# Patient Record
Sex: FEMALE | Race: WHITE
[De-identification: names, ages, dates, MRNs, and addresses within clinical notes are randomized per-mention and may not be internally consistent; named-entity substitution may affect disease eponyms.]

---

## 2019-03-10 ENCOUNTER — HOSPITAL ENCOUNTER (INPATIENT)
Dept: HOSPITAL 31 - C.ER | Age: 84
LOS: 3 days | Discharge: HOME | DRG: 191 | End: 2019-03-13
Attending: INTERNAL MEDICINE | Admitting: INTERNAL MEDICINE
Payer: MEDICARE

## 2019-03-10 VITALS — BODY MASS INDEX: 36.8 KG/M2

## 2019-03-10 DIAGNOSIS — M46.97: ICD-10-CM

## 2019-03-10 DIAGNOSIS — J20.9: ICD-10-CM

## 2019-03-10 DIAGNOSIS — N18.4: ICD-10-CM

## 2019-03-10 DIAGNOSIS — J44.0: Primary | ICD-10-CM

## 2019-03-10 DIAGNOSIS — E11.65: ICD-10-CM

## 2019-03-10 DIAGNOSIS — E11.22: ICD-10-CM

## 2019-03-10 DIAGNOSIS — Z99.81: ICD-10-CM

## 2019-03-10 DIAGNOSIS — I12.9: ICD-10-CM

## 2019-03-10 DIAGNOSIS — Z79.4: ICD-10-CM

## 2019-03-10 DIAGNOSIS — M79.89: ICD-10-CM

## 2019-03-10 LAB
ALBUMIN SERPL-MCNC: 3.6 G/DL (ref 3.5–5)
ALBUMIN/GLOB SERPL: 1.1 {RATIO} (ref 1–2.1)
ALT SERPL-CCNC: 14 U/L (ref 9–52)
APTT BLD: 31 SECONDS (ref 21–34)
AST SERPL-CCNC: 19 U/L (ref 14–36)
BASOPHILS # BLD AUTO: 0.1 K/UL (ref 0–0.2)
BASOPHILS NFR BLD: 1 % (ref 0–2)
BNP SERPL-MCNC: 851 PG/ML (ref 0–900)
BUN SERPL-MCNC: 34 MG/DL (ref 7–17)
CALCIUM SERPL-MCNC: 9.1 MG/DL (ref 8.6–10.4)
EOSINOPHIL # BLD AUTO: 0.4 K/UL (ref 0–0.7)
EOSINOPHIL NFR BLD: 4.8 % (ref 0–4)
ERYTHROCYTE [DISTWIDTH] IN BLOOD BY AUTOMATED COUNT: 14.7 % (ref 11.5–14.5)
GFR NON-AFRICAN AMERICAN: 29
HGB BLD-MCNC: 11.6 G/DL (ref 11–16)
INR PPP: 1.1
LYMPHOCYTES # BLD AUTO: 1.6 K/UL (ref 1–4.3)
LYMPHOCYTES NFR BLD AUTO: 17.3 % (ref 20–40)
MCH RBC QN AUTO: 29.1 PG (ref 27–31)
MCHC RBC AUTO-ENTMCNC: 32 G/DL (ref 33–37)
MCV RBC AUTO: 90.8 FL (ref 81–99)
MONOCYTES # BLD: 0.5 K/UL (ref 0–0.8)
MONOCYTES NFR BLD: 5.5 % (ref 0–10)
NEUTROPHILS # BLD: 6.4 K/UL (ref 1.8–7)
NEUTROPHILS NFR BLD AUTO: 71.4 % (ref 50–75)
NRBC BLD AUTO-RTO: 0 % (ref 0–2)
PLATELET # BLD: 344 K/UL (ref 130–400)
PMV BLD AUTO: 9.3 FL (ref 7.2–11.7)
PROTHROMBIN TIME: 12.5 SECONDS (ref 9.7–12.2)
RBC # BLD AUTO: 4 MIL/UL (ref 3.8–5.2)
WBC # BLD AUTO: 9 K/UL (ref 4.8–10.8)

## 2019-03-10 RX ADMIN — INSULIN ASPART SCH: 100 INJECTION, SOLUTION INTRAVENOUS; SUBCUTANEOUS at 21:54

## 2019-03-10 RX ADMIN — IPRATROPIUM BROMIDE AND ALBUTEROL SULFATE SCH ML: .5; 3 SOLUTION RESPIRATORY (INHALATION) at 15:54

## 2019-03-10 RX ADMIN — IPRATROPIUM BROMIDE AND ALBUTEROL SULFATE SCH ML: .5; 3 SOLUTION RESPIRATORY (INHALATION) at 16:10

## 2019-03-10 NOTE — RAD
Date of service: 



03/10/2019



PROCEDURE:  CHEST RADIOGRAPH, 1 VIEW



HISTORY:

chest pain



COMPARISON:

Comparison is made with 01/19/2019



FINDINGS:



LUNGS:

No evidence of new infiltrate or consolidation in the lungs.



PLEURA:

No pneumothorax or pleural fluid seen.



CARDIOVASCULAR:

 No aortic atherosclerotic calcification present. Normal.



OSSEOUS STRUCTURES:

No significant abnormalities.



VISUALIZED UPPER ABDOMEN:

Normal.



OTHER FINDINGS:

None. 



IMPRESSION:

No active disease.

## 2019-03-10 NOTE — C.PDOC
History Of Present Illness





 USED, [place] [id number]


82 y/o female, with history of mild diabetes, comes in complaining of SOB since 

last night, but no associated chest pain, contrary to triage note. Patient 

reports she gets SOB when she coughs, but otherwise she denies fever, chills, 

vomiting, or abdominal pain. As per , patient has O2 at home when she 

gets SOB. Patient was seen in ED 2 months ago for pneumonia and discharged home.

PMD: Roxie Duran 





Time Seen by Provider: 03/10/19 13:28


Chief Complaint (Nursing): Chest Pain


History Per: 


History/Exam Limitations: no limitations


Onset/Duration Of Symptoms: Days


Current Symptoms Are (Timing): Still Present





Past Medical History


Reviewed: Historical Data, Nursing Documentation, Vital Signs


Vital Signs: 





                                Last Vital Signs











Temp  97.4 F L  03/10/19 13:36


 


Pulse  68   03/10/19 13:49


 


Resp  16   03/10/19 13:36


 


BP  142/64   03/10/19 13:36


 


Pulse Ox  98   03/10/19 13:36














- Medical History


PMH: Arthritis (LBP), Asthma, Back Problems, COPD, Diabetes, Gall Bladder 

Disease, HTN


   Denies: Chronic Kidney Disease


Surgical History: Cholecystectomy





- [facility] Procedures











LAPAROSCOPIC CHOLECYSTECTOMY (13)


PERCUTAN ASPIRATION GB (13)


PLICATION OF VENA CAVA (14)








Family History: States: No Known Family Hx





- Social History


Hx Tobacco Use: No


Hx Alcohol Use: No


Hx Substance Use: No





- Immunization History


Hx Tetanus Toxoid Vaccination: No


Hx Influenza Vaccination: No


Hx Pneumococcal Vaccination: No





Review Of Systems


Except As Marked, All Systems Reviewed And Found Negative.


Constitutional: Negative for: Fever, Chills


Cardiovascular: Negative for: Chest Pain


Respiratory: Positive for: Shortness of Breath.  Negative for: Cough


Gastrointestinal: Negative for: Vomiting, Abdominal Pain


Genitourinary: Negative for: Dysuria, Hematuria


Neurological: Negative for: Weakness, Numbness





Physical Exam





- Physical Exam


Appears: Well, Non-toxic, No Acute Distress


Skin: Warm, Dry


Head: Atraumatic, Normacephalic


Eye(s): bilateral: PERRL, EOMI, Other (conjunctiva clear)


Oral Mucosa: Moist


Neck: Supple


Chest: Symmetrical


Cardiovascular: Rhythm Regular, No Murmur, Other (Normal S1,S2)


Respiratory: No Rales, No Rhonchi, Wheezing (mild expiratory wheezing)


Gastrointestinal/Abdominal: Soft, No Tenderness, No Distention, No Guarding, No 

Rebound


Extremity: No Pedal Edema, No Deformity


Extremity: Bilateral: Atraumatic, Normal Color And Temperature


Neurological/Psych: Oriented x3, Normal Speech, Other (GCS 15, CN 2-12 intact)





ED Course And Treatment





- Laboratory Results


Result Diagrams: 


                                 03/10/19 14:04





                                 03/10/19 14:04


Lab Results: 





                                        











PT  12.5 SECONDS (9.7-12.2)  H  03/10/19  14:04    


 


INR  1.1   03/10/19  14:04    


 


APTT  31 SECONDS (21-34)   03/10/19  14:04    











ECG: Interpreted By Me, Viewed By Me


ECG Rhythm: Sinus Rhythm


ECG Interpretation: Normal


Rate From EC


O2 Sat by Pulse Oximetry: 98 (RA)


Pulse Ox Interpretation: Normal





- Other Rad


  ** CXR


X-Ray: Read By Radiologist


Interpretation: IMPRESSION:  No active disease.





Medical Decision Making


Medical Decision Making: 





Plan:


--EKG


--Labs


--Chest XR





15:50


Spoke to patient's daughter on the phone who states that patient had chest pain 

all night yesterday. 





18:08:


Patient has brown stool. 


Control positive 


Hemoccult negative. 


[id number]


exp: 





Progress:


Dr. Wright accepted patient under his service for admission. 





Disposition





- Disposition


Forms:  [facility] (English)





- Scribe Statement


The provider has reviewed the documentation as recorded by the Eusebioibdora Mcgee





Provider Attestation: 





All medical record entries made by the Scribe were at my direction and 

personally dictated by me. I have reviewed the chart and agree that the record 

accurately reflects my personal performance of the history, physical exam, 

medical decision making, and the department course for this patient. I have also

personally directed, reviewed, and agree with the discharge instructions and 

disposition.

## 2019-03-11 LAB
ALBUMIN SERPL-MCNC: 3.7 G/DL (ref 3.5–5)
ALBUMIN/GLOB SERPL: 1 {RATIO} (ref 1–2.1)
ALT SERPL-CCNC: 17 U/L (ref 9–52)
AST SERPL-CCNC: 16 U/L (ref 14–36)
BACTERIA #/AREA URNS HPF: (no result) /[HPF]
BASOPHILS # BLD AUTO: 0 K/UL (ref 0–0.2)
BASOPHILS NFR BLD: 0.2 % (ref 0–2)
BILIRUB UR-MCNC: NEGATIVE MG/DL
BUN SERPL-MCNC: 37 MG/DL (ref 7–17)
CALCIUM SERPL-MCNC: 9.1 MG/DL (ref 8.6–10.4)
EOSINOPHIL # BLD AUTO: 0 K/UL (ref 0–0.7)
EOSINOPHIL NFR BLD: 0 % (ref 0–4)
ERYTHROCYTE [DISTWIDTH] IN BLOOD BY AUTOMATED COUNT: 14.5 % (ref 11.5–14.5)
ERYTHROCYTE [DISTWIDTH] IN BLOOD BY AUTOMATED COUNT: 14.8 % (ref 11.5–14.5)
GFR NON-AFRICAN AMERICAN: 29
GLUCOSE UR STRIP-MCNC: NORMAL MG/DL
HGB BLD-MCNC: 11.1 G/DL (ref 11–16)
HGB BLD-MCNC: 11.9 G/DL (ref 11–16)
LEUKOCYTE ESTERASE UR-ACNC: (no result) LEU/UL
LYMPHOCYTE: 6 % (ref 20–40)
LYMPHOCYTES # BLD AUTO: 1.4 K/UL (ref 1–4.3)
LYMPHOCYTES NFR BLD AUTO: 7.9 % (ref 20–40)
MCH RBC QN AUTO: 28.4 PG (ref 27–31)
MCH RBC QN AUTO: 28.5 PG (ref 27–31)
MCHC RBC AUTO-ENTMCNC: 31.5 G/DL (ref 33–37)
MCHC RBC AUTO-ENTMCNC: 31.7 G/DL (ref 33–37)
MCV RBC AUTO: 89.9 FL (ref 81–99)
MCV RBC AUTO: 90.2 FL (ref 81–99)
MONOCYTE: 4 % (ref 0–10)
MONOCYTES # BLD: 0.7 K/UL (ref 0–0.8)
MONOCYTES NFR BLD: 3.7 % (ref 0–10)
NEUTROPHILS # BLD: 15.9 K/UL (ref 1.8–7)
NEUTROPHILS NFR BLD AUTO: 88 % (ref 50–75)
NEUTROPHILS NFR BLD AUTO: 88.2 % (ref 50–75)
NEUTS BAND NFR BLD: 2 % (ref 0–2)
NRBC BLD AUTO-RTO: 0 % (ref 0–2)
PH UR STRIP: 5 [PH] (ref 5–8)
PLATELET # BLD EST: NORMAL 10*3/UL
PLATELET # BLD: 368 K/UL (ref 130–400)
PLATELET # BLD: 392 K/UL (ref 130–400)
PMV BLD AUTO: 8.9 FL (ref 7.2–11.7)
PMV BLD AUTO: 9.1 FL (ref 7.2–11.7)
PROT UR STRIP-MCNC: NEGATIVE MG/DL
RBC # BLD AUTO: 3.89 MIL/UL (ref 3.8–5.2)
RBC # BLD AUTO: 4.2 MIL/UL (ref 3.8–5.2)
RBC # UR STRIP: NEGATIVE /UL
RBC MORPH BLD: NORMAL
SP GR UR STRIP: 1.01 (ref 1–1.03)
SQUAMOUS EPITHIAL: 6 /HPF (ref 0–5)
TOTAL CELLS COUNTED BLD: 100
URINE AMORPHOUS SEDIMENT: (no result) /UL
UROBILINOGEN UR-MCNC: NORMAL MG/DL (ref 0.2–1)
WBC # BLD AUTO: 18 K/UL (ref 4.8–10.8)
WBC # BLD AUTO: 18.7 K/UL (ref 4.8–10.8)

## 2019-03-11 RX ADMIN — INSULIN ASPART SCH: 100 INJECTION, SOLUTION INTRAVENOUS; SUBCUTANEOUS at 12:04

## 2019-03-11 RX ADMIN — CEFEPIME SCH MLS/HR: 1 INJECTION, SOLUTION INTRAVENOUS at 13:33

## 2019-03-11 RX ADMIN — STANDARDIZED SENNA CONCENTRATE AND DOCUSATE SODIUM SCH TAB: 8.6; 5 TABLET, FILM COATED ORAL at 15:03

## 2019-03-11 RX ADMIN — INSULIN ASPART SCH: 100 INJECTION, SOLUTION INTRAVENOUS; SUBCUTANEOUS at 17:25

## 2019-03-11 RX ADMIN — FLUTICASONE FUROATE AND VILANTEROL TRIFENATATE SCH: 100; 25 POWDER RESPIRATORY (INHALATION) at 11:25

## 2019-03-11 RX ADMIN — Medication SCH TAB: at 10:30

## 2019-03-11 RX ADMIN — GUAIFENESIN SCH MG: 600 TABLET, EXTENDED RELEASE ORAL at 17:23

## 2019-03-11 RX ADMIN — INSULIN ASPART SCH: 100 INJECTION, SOLUTION INTRAVENOUS; SUBCUTANEOUS at 22:21

## 2019-03-11 RX ADMIN — INSULIN ASPART SCH: 100 INJECTION, SOLUTION INTRAVENOUS; SUBCUTANEOUS at 08:40

## 2019-03-11 RX ADMIN — GUAIFENESIN SCH MG: 600 TABLET, EXTENDED RELEASE ORAL at 10:31

## 2019-03-11 RX ADMIN — CEFEPIME SCH MLS/HR: 1 INJECTION, SOLUTION INTRAVENOUS at 23:53

## 2019-03-11 NOTE — CP.PCM.CON
History of Present Illness





- History of Present Illness


History of Present Illness: 





82 y/o female, with history of mild diabetes, comes in complaining of SOB since 

last night, but no associated chest pain, contrary to triage note. Patient 

reports she gets SOB when she coughs, but otherwise she denies fever, chills, vo

miting, or abdominal pain. As per , patient has O2 at home when she gets 

SOB. Patient was seen in ED 2 months ago for pneumonia and discharged home. 








- Medical History


PMH: Arthritis (LBP), Asthma, Back Problems, COPD, Diabetes, Gall Bladder 

Disease, HTN


   Denies: Chronic Kidney Disease


Surgical History: Cholecystectomy





- [facility] Procedures











LAPAROSCOPIC CHOLECYSTECTOMY (09/19/13)


PERCUTAN ASPIRATION GB (09/19/13)


PLICATION OF VENA CAVA (03/21/14)





Review of Systems





- Constitutional


Constitutional: As Per HPI





- EENT


Eyes: absent: As Per HPI, Blind Spots, Blurred Vision, Change in Vision, 

Decreased Night Vision, Diplopia, Discharge, Dry Eye, Exophthalmos, Floaters, 

Irritation, Itchy Eyes, Loss of Peripheral Vision, Pain, Photophobia, Requires 

Corrective Lenses, Sees Flashes, Spots in Vision, Tunnel Vision, Other Visual 

Disturbances, Loss of Vision, Other


Ears: absent: As Per HPI, Decreased Hearing, Ear Discharge, Ear Pain, Tinnitus, 

Abnormal Hearing, Disequilibrium, Dizziness, Other


Nose/Mouth/Throat: absent: As Per HPI, Epistaxis, Nasal Congestion, Nasal 

Discharge, Nasal Obstruction, Nasal Trauma, Nose Pain, Post Nasal Drip, Sinus 

Pain, Sinus Pressure, Bleeding Gums, Change in Voice, Dental Pain, Dry Mouth, 

Dysphagia, Halitosis, Hoarsness, Lip Swelling, Mouth Lesions, Mouth Pain, 

Odynophagia, Sore Throat, Throat Swelling, Tongue Swelling, Facial Pain, Neck 

Pain, Neck Mass, Other





- Breasts


Breasts: absent: As Per HPI, Change in Shape, Mass, Pain, Nipple Discharge, 

Nipple Inversion, Skin Changes, Swelling, Other





- Cardiovascular


Cardiovascular: As Per HPI





- Respiratory


Respiratory: absent: As Per HPI, Cough, Dyspnea, Hemoptysis, Dyspnea on 

Exertion, Wheezing, Snoring, Stridor, Pain on Inspiration, Chest Congestion, 

Excessive Mucous Production, Change in Mucous Color, Pain with Coughing, Other





- Gastrointestinal


Gastrointestinal: As Per HPI





- Genitourinary


Genitourinary: absent: As Per HPI, Change in Urinary Stream, Difficulty 

Urinating, Dysuria, Flank Pain, Hematuria, Pyuria, Nocturia, Urinary 

Incontinence, Urinary Frequency, Urinary Hesitance, Urinary Urgency, Voiding 

Freq/Small Amts, Freq UTI, Hx Renal/Bladder Calculi, Hx /Renal Surgery, 

Bladder Distension, Other





- Reproductive: Female


Reproductive:Female: absent: As Per HPI, Amenorrhea, Amenorrhea/Birth Control, 

Currently Menstual, Cycle <21 Days, Cycle >35 Days, Cycle Variable, Menses 1-7 

Days, Menses >/= 8 Days, Menses Variable, Cycle > 4 Weeks Between, No Menses for

6 Months, Heavy Menses, Light Menses, Normal Menses, Spotting Between Cycles, 

S/P Hysterectomy, Menopausal, Post Menopausal, Premenarche, Abnormal Vaginal 

Bleeding, Dysmenorrhea, Dyspareunia, Genital Lesions, Genital Pruritis, Pelvic 

Pain, Prolapse Symptoms, Sexual Dysfunction, Vaginal Discharge, Vaginal Dryness,

Vaginal Odor, Vaginal Pruritis, Other





- Menstruation


Menstruation: absent: As Per HPI, Amenorrhea, Amenorrhea/Birth Control, 

Currently Menstual, Cycle <21 Days, Cycle >35 Days, Cycle Variable, Menses 1-7 

Days, Menses >/= 8 Days, Menses Variable, Cycle > 4 Weeks Between, No Menses for

6 Months, Heavy Menses, Light Menses, Normal Menses, Spotting Between Cycles, 

S/P Hysterectomy, Menopausal, Post Menopausal, Premenarche, Abnormal Vaginal 

Bleeding, Dysmenorrhea, Other





- Musculoskeletal


Musculoskeletal: absent: As Per HPI, Abnormal Gait, Arthralgias, Atrophy, Back 

Pain, Deformity, Joint Swelling, Limited Range of Motion, Loss of Height, Muscle

Cramps, Muscle Weakness, Myalgias, Neck Pain, Numbness, Radiating Pain into 

Limb, Stiffness, Tingling, Other





- Integumentary


Integumentary: absent: As Per HPI, Acne, Alopecia, Bleeding Lesions, Change in 

Hair, Change in Nails, Change in Pigmentation, Changing Lesions, Dry Skin, 

Erythema, Furuncle, Hirsutism, Lesions, New Lesions, Non-Healing Lesions, 

Photosensitivity, Pruritus, Rash, Skin Pain, Skin Ulcer, Sores, Striae, 

Swelling, Unusual Bruising, Wounds, Jaundice, Other





- Neurological


Neurological: absent: As Per HPI, Abnormal Gait, Abnormal Hearing, Abnormal 

Movements, Abnormal Speech, Behavioral Changes, Burning Sensations, Confusion, 

Convulsions, Disequilibrium, Dizziness, Numbness, Focal Weakness, Frequent Fall

s, Headaches, Lack of Coordination, Loss of Vision, Memory Loss, Paresthesias, 

Radicular Pain, Restless Legs, Sensory Deficit, Syncope, Tingling, Tremor, 

Vertigo, Weakness, Other Visual Disturbances, Other





- Psychiatric


Psychiatric: absent: As Per HPI, Abnormal Sleep Pattern, Anhedonia, Anxiety, 

Auditory Hallucinations, Behavioral Changes, Change in Appetite, Change in 

Libido, Confusion, Depression, Difficulty Concentrating, Hallucinations, 

Homicidal Ideation, Hopelessness, Irritability, Memory Loss, Mood Swings, Panic 

Attacks, Paranoia, Suicidal Ideation, Visual Hallucinations, Tactile 

Hallucinations, Other





- Endocrine


Endocrine: absent: As Per HPI, Change in Body Appearance, Change in Libido, Cold

Intolorance, Deepening of Voice, Excessive Sweating, Fatigue, Flushing, Heat 

Intolorance, Increase in Ring/Shoe/Hat Size, Palpitations, Polydipsia, 

Polyphagia, Polyuria, Other





- Hematologic/Lymphatic


Hematologic: absent: As Per HPI, Easy Bleeding, Easy Bruising, Lymphadenopathy, 

Other





Past Patient History





- Past Medical History & Family History


Past Medical History?: Yes





- Past Social History


Smoking Status: Never Smoked





- CARDIAC


Hx Cardiac Disorders: Yes


Hx Hypertension: Yes





- PULMONARY


Hx Respiratory Disorders: Yes


Hx Asthma: Yes


Hx Chronic Obstructive Pulmonary Disease (COPD): Yes





- NEUROLOGICAL


Hx Neurological Disorder: No





- HEENT


Hx HEENT Problems: No


Other/Comment: eye dryness,uses liquifilm at home





- RENAL


Hx Chronic Kidney Disease: No





- ENDOCRINE/METABOLIC


Hx Endocrine Disorders: Yes


Hx Diabetes Mellitus Type 1: Yes





- HEMATOLOGICAL/ONCOLOGICAL


Hx Blood Disorders: No





- INTEGUMENTARY


Hx Dermatological Problems: No


Other/Comment: whitish skin discoloration chest and part of abdomen appeared 

since long time ago





- MUSCULOSKELETAL/RHEUMATOLOGICAL


Hx Musculoskeletal Disorders: No


Hx Arthritis: Yes (LBP)


Hx Falls: No





- GASTROINTESTINAL


Hx Gastrointestinal Disorders: Yes


Hx Constipation: Yes (states she takes med to help with bowel movement (med from

[facility]))


Hx Gall Bladder Disease: Yes (cholecystectomy)





- GENITOURINARY/GYNECOLOGICAL


Hx Genitourinary Disorders: No





- PSYCHIATRIC


Hx Psychophysiologic Disorder: No


Hx Substance Use: No





- SURGICAL HISTORY


Hx Surgeries: Yes


Hx Cholecystectomy: Yes





- ANESTHESIA


Hx Anesthesia: Yes


Hx Anesthesia Reactions: No


Hx Malignant Hyperthermia: No





Meds


Allergies/Adverse Reactions: 


                                    Allergies











Allergy/AdvReac Type Severity Reaction Status Date / Time


 


No Known Allergies Allergy   Verified 03/10/19 13:38














- Medications


Medications: 


                               Current Medications





Enoxaparin Sodium (Lovenox)  40 mg SC DAILY [facility]


   Last Admin: 03/11/19 10:34 Dose:  40 mg


Fluticasone/Vilanterol (Breo Ellipta 100-25 Mcg Inh)  1 puff INH RQ24 [facility]


Glipizide (Glucotrol)  5 mg PO DAILY [facility]


   Last Admin: 03/11/19 10:30 Dose:  5 mg


Guaifenesin (Mucinex La)  600 mg PO BID [facility]


   Last Admin: 03/11/19 10:31 Dose:  600 mg


Home Med (Fosamax)  70 mg PO QWK [facility]


Sodium Chloride (Sodium Chloride 0.45%)  1,000 mls @ 60 mls/hr IV .[id number] [facility]


Cefepime HCl (Maxipime Iv 1 Gm Premix)  1 gm in 50 mls @ 100 mls/hr IVPB Q12H 

[facility]; Protocol


Insulin Aspart (Novolog)  0 unit SC ACHS [facility]; Protocol


   Last Admin: 03/11/19 08:40 Dose:  Not Given


Lactulose (Enulose)  20 gm PO DAILY [facility]


   Last Admin: 03/11/19 10:28 Dose:  20 gm


Loratadine (Claritin)  10 mg PO DAILY [facility]


   Last Admin: 03/11/19 10:31 Dose:  10 mg


Losartan Potassium (Cozaar)  100 mg PO DAILY [facility]


   Last Admin: 03/11/19 10:30 Dose:  100 mg


Multivitamins (Hexavitamin)  1 tab PO DAILY [facility]


   Last Admin: 03/11/19 10:30 Dose:  1 tab


Rosuvastatin Calcium (Crestor)  40 mg PO HS [facility]


   Last Admin: 03/10/19 21:55 Dose:  40 mg


Tolterodine Tartrate (Detrol)  4 mg PO DAILY [facility]


   Last Admin: 03/11/19 10:31 Dose:  4 mg











Physical Exam





- Constitutional


Appears: No Acute Distress, Chronically Ill





- Head Exam


Head Exam: ATRAUMATIC, NORMAL INSPECTION, NORMOCEPHALIC





- Eye Exam


Eye Exam: PERRL.  absent: Scleral icterus


Pupil Exam: NORMAL ACCOMODATION





- ENT Exam


ENT Exam: Mucous Membranes Dry, Normal External Ear Exam, Normal Oropharynx





- Neck Exam


Neck exam: Negative for: Lymphadenopathy





- Respiratory Exam


Respiratory Exam: Decreased Breath Sounds, Prolonged Expiratory Phase





- Cardiovascular Exam


Cardiovascular Exam: REGULAR RHYTHM, +S1, +S2





- GI/Abdominal Exam


GI & Abdominal Exam: Diminished Bowel Sounds, Soft.  absent: Tenderness





- Rectal Exam


Rectal Exam: Deferred





-  Exam


 Exam: NORMAL INSPECTION





- Extremities Exam


Extremities exam: Positive for: pedal pulses present.  Negative for: calf 

tenderness, normal inspection, pedal edema, tenderness





- Back Exam


Back exam: absent: CVA tenderness (L), CVA tenderness (R)





- Neurological Exam


Neurological exam: Alert, CN II-XII Intact, Oriented x3, Reflexes Normal





- Psychiatric Exam


Psychiatric exam: Depressed





- Skin


Skin Exam: Dry, Intact





Results





- Vital Signs


Recent Vital Signs: 


                                Last Vital Signs











Temp  97.9 F   03/11/19 08:24


 


Pulse  80   03/11/19 10:30


 


Resp  20   03/11/19 08:24


 


BP  118/69   03/11/19 10:30


 


Pulse Ox  95   03/11/19 10:30














- Labs


Result Diagrams: 


                                 03/11/19 20:21





                                 03/11/19 10:25


Labs: 


                         Laboratory Results - last 24 hr











  03/10/19 03/10/19 03/10/19





  14:04 14:04 14:04


 


WBC  9.0  


 


RBC  4.00  


 


Hgb  11.6  


 


Hct  36.4  


 


MCV  90.8  D  


 


MCH  29.1  


 


MCHC  32.0 L  


 


RDW  14.7 H  


 


Plt Count  344  


 


MPV  9.3  


 


Neut % (Auto)  71.4  


 


Lymph % (Auto)  17.3 L  


 


Mono % (Auto)  5.5  


 


Eos % (Auto)  4.8 H  


 


Baso % (Auto)  1.0  


 


Neut # (Auto)  6.4  


 


Lymph # (Auto)  1.6  


 


Mono # (Auto)  0.5  


 


Eos # (Auto)  0.4  


 


Baso # (Auto)  0.1  


 


Neutrophils % (Manual)   


 


Band Neutrophils %   


 


Lymphocytes % (Manual)   


 


Monocytes % (Manual)   


 


Platelet Estimate   


 


RBC Morphology   


 


PT   12.5 H 


 


INR   1.1 


 


APTT   31 


 


D-Dimer, Quantitative   


 


Sodium    139


 


Potassium    4.9


 


Chloride    108 H


 


Carbon Dioxide    25


 


Anion Gap    11


 


BUN    34 H


 


Creatinine    1.7 H


 


Est GFR ( Amer)    35


 


Est GFR (Non-Af Amer)    29


 


POC Glucose (mg/dL)   


 


Random Glucose    105


 


Calcium    9.1


 


Phosphorus   


 


Magnesium   


 


Total Bilirubin    0.6


 


AST    19


 


ALT    14


 


Alkaline Phosphatase    86


 


Troponin I    < 0.0120


 


NT-Pro-B Natriuret Pep    851


 


Total Protein    7.0


 


Albumin    3.6


 


Globulin    3.4


 


Albumin/Globulin Ratio    1.1














  03/10/19 03/10/19 03/11/19





  14:29 21:14 06:38


 


WBC   


 


RBC   


 


Hgb   


 


Hct   


 


MCV   


 


MCH   


 


MCHC   


 


RDW   


 


Plt Count   


 


MPV   


 


Neut % (Auto)   


 


Lymph % (Auto)   


 


Mono % (Auto)   


 


Eos % (Auto)   


 


Baso % (Auto)   


 


Neut # (Auto)   


 


Lymph # (Auto)   


 


Mono # (Auto)   


 


Eos # (Auto)   


 


Baso # (Auto)   


 


Neutrophils % (Manual)   


 


Band Neutrophils %   


 


Lymphocytes % (Manual)   


 


Monocytes % (Manual)   


 


Platelet Estimate   


 


RBC Morphology   


 


PT   


 


INR   


 


APTT   


 


D-Dimer, Quantitative  676 H  


 


Sodium   


 


Potassium   


 


Chloride   


 


Carbon Dioxide   


 


Anion Gap   


 


BUN   


 


Creatinine   


 


Est GFR ( Amer)   


 


Est GFR (Non-Af Amer)   


 


POC Glucose (mg/dL)   207 H  155 H


 


Random Glucose   


 


Calcium   


 


Phosphorus   


 


Magnesium   


 


Total Bilirubin   


 


AST   


 


ALT   


 


Alkaline Phosphatase   


 


Troponin I   


 


NT-Pro-B Natriuret Pep   


 


Total Protein   


 


Albumin   


 


Globulin   


 


Albumin/Globulin Ratio   














  03/11/19 03/11/19





  10:25 10:25


 


WBC  18.0 H D 


 


RBC  4.20 


 


Hgb  11.9 


 


Hct  37.9 


 


MCV  90.2 


 


MCH  28.4 


 


MCHC  31.5 L 


 


RDW  14.8 H 


 


Plt Count  392 


 


MPV  8.9 


 


Neut % (Auto)  88.2 H 


 


Lymph % (Auto)  7.9 L 


 


Mono % (Auto)  3.7 


 


Eos % (Auto)  0.0 


 


Baso % (Auto)  0.2 


 


Neut # (Auto)  15.9 H 


 


Lymph # (Auto)  1.4 


 


Mono # (Auto)  0.7 


 


Eos # (Auto)  0.0 


 


Baso # (Auto)  0.0 


 


Neutrophils % (Manual)  88 H 


 


Band Neutrophils %  2 


 


Lymphocytes % (Manual)  6 L 


 


Monocytes % (Manual)  4 


 


Platelet Estimate  Normal 


 


RBC Morphology  Normal 


 


PT  


 


INR  


 


APTT  


 


D-Dimer, Quantitative  


 


Sodium   138


 


Potassium   5.0


 


Chloride   109 H


 


Carbon Dioxide   24


 


Anion Gap   10


 


BUN   37 H


 


Creatinine   1.7 H


 


Est GFR ( Amer)   35


 


Est GFR (Non-Af Amer)   29


 


POC Glucose (mg/dL)  


 


Random Glucose   150 H D


 


Calcium   9.1


 


Phosphorus   3.9


 


Magnesium   2.0


 


Total Bilirubin   0.5


 


AST   16


 


ALT   17


 


Alkaline Phosphatase   77


 


Troponin I  


 


NT-Pro-B Natriuret Pep  


 


Total Protein   7.3


 


Albumin   3.7


 


Globulin   3.6


 


Albumin/Globulin Ratio   1.0














Assessment & Plan


(1) Dyspnea


Status: Acute   





(2) Leg swelling


Status: Acute   





(3) Leukocytosis


Status: Acute   





- Assessment and Plan (Free Text)


Assessment: 





r/o sepsis


source unclear


r/o early pneumonia


r/o UTI





Iv antibiotics ordered after cultures sent

## 2019-03-11 NOTE — CARD
--------------- APPROVED REPORT --------------





Date of service: 03/10/2019



EKG Measurement

Heart [id number]

[place] [id number]

[id number]

[id number]

[id number]



<Conclusion>

Normal sinus rhythm

Normal ECG

## 2019-03-11 NOTE — NM
Date of service: 



03/10/2019



COMPARISON:

03/21/2014 ventilation-perfusion scan.



03/10/2019 single-view chest



TECHNIQUE:

14.2 mCi technetium 99-m  [id number] Gas. 



4.0 mCI technetium 99-m MAA administered intravenously.



FINDINGS:



VENTILATION COMPONENT:

Normal.



PERFUSION COMPONENT:

Heterogeneous distribution of radionuclide. No geographic, segmental, 

lobar abnormalities apparent on the present examination.



IMPRESSION:

Low probability ventilation perfusion scan for pulmonary embolism. 



No significant interval change compared to the prior examination(s).



___________________________________________________



Concordant findings (preliminary report) provided by [facility].

## 2019-03-11 NOTE — CARD
--------------- APPROVED REPORT --------------





Date of service: 03/11/2019



EXAM: Two-dimensional and M-mode echocardiogram with Doppler and 

color Doppler.



Other Information 

Quality : GoodRhythm : 



INDICATION

Dyspnea Chest Pain 



RISK FACTORS

Hypertension 

Diabetes



2D DIMENSIONS 

IVSd1.0   (0.7-1.1cm)LVDd4.7   (3.9-5.9cm)

PWd0.8   (0.7-1.1cm)LA Lybahr86   (18-58mL)

LVDs3.5   (2.5-4.0cm)FS (%) 25.1   %

LVEF (%)70.0   (>50%)LVEF (Faustin's)66.16 %

IVC0.00 cm



M-Mode DIMENSIONS 

RVDd1.25   (2.1-3.2cm)Left Atrium (MM)4.96   (2.5-4.0cm)

IVSd1.08   (0.7-1.1cm)Aortic Root2.81   (2.2-3.7cm)

LVDd5.34   (4.0-5.6cm)Aortic Cusp Exc.1.85   (1.5-2.0cm)

PWd1.18   (0.7-1.1cm)FS (%) 51   %

LVDs2.60   (2.0-3.8cm)LVEF (%)82   (>50%)



Mitral Valve

MV E Utshbqhm73.3cm/sMV A Ufgnyahs774.0cm/sE/A ratio0.7



TDI

Lateral E' Peak V9.51cm/sMedial E' Peak V7.00cm/sE/Lateral E'7.7

E/Medial E'10.5



Tricuspid Valve

TR Peak [id number]/sTR Peak [id number]



 LEFT VENTRICLE 

The left ventricle is normal size.

There is normal left ventricular wall thickness.

The left ventricular function is normal.

The left ventricular ejection fraction is within the normal range.

There is normal LV segmental wall motion.

Transmitral Doppler flow pattern is normal for age.



 RIGHT VENTRICLE 

The right ventricle is normal size.

The right ventricular systolic function is normal.



 ATRIA 

The left atrium is mildly dilated.

The right atrium size is normal.



 AORTIC VALVE 

The aortic valve is normal in structure.

No aortic regurgitation is present.

There is no aortic valvular stenosis. 



 MITRAL VALVE 

The mitral valve is normal in structure.

Mitral annular calcification is borderline.

There is no mitral valve regurgitation noted.



 TRICUSPID VALVE 

The tricuspid valve is normal in structure.

There is mild tricuspid regurgitation.

There is no pulmonary hypertension.



 PULMONIC VALVE 

The pulmonary valve is normal in structure.

There is trace pulmonic valvular regurgitation. 



 GREAT VESSELS 

The aortic root is normal in 

The aortic root displays mild sclerocalcific changes

The IVC is normal in size and collapses >50% with inspiration.



 PERICARDIAL EFFUSION 

There is no pericardial effusion.



<Conclusion>

Normal bi-ventricular function.

The left atrium is mildly dilated.

No significant valvular abnormality for age.

No pericardial effusion.

## 2019-03-11 NOTE — CP.PCM.HP
History of Present Illness





- History of Present Illness


History of Present Illness: 





pt came for sob pain and swelling both legs 





Present on Admission





- Present on Admission


Any Indicators Present on Admission: No





Review of Systems





- Review of Systems


Systems not reviewed;Unavailable: Acuity of Condition





- Constitutional


Constitutional: Fatigue, Malaise





- EENT


Eyes: As Per HPI


Ears: As Per HPI


Nose/Mouth/Throat: As Per HPI





- Breasts


Breasts: As Per HPI





- Cardiovascular


Cardiovascular: Edema, Lightheadedness





- Respiratory


Respiratory: Dyspnea





- Gastrointestinal


Gastrointestinal: As Per HPI





- Genitourinary


Genitourinary: As Per HPI





- Reproductive: Female


Reproductive:Female: As Per HPI, Post Menopausal





- Menstruation


Menstruation: Post Menopausal





- Musculoskeletal


Musculoskeletal: Arthralgias, Joint Swelling, Radiating Pain into Limb





- Integumentary


Integumentary: As Per HPI





- Neurological


Neurological: Lack of Coordination, Weakness





- Psychiatric


Psychiatric: As Per HPI





- Endocrine


Endocrine: As Per HPI





- Hematologic/Lymphatic


Hematologic: As Per HPI





Past Patient History





- Past Medical History & Family History


Past Medical History?: Yes





- Past Social History


Smoking Status: Never Smoked





- CARDIAC


Hx Cardiac Disorders: Yes


Hx Hypertension: Yes





- PULMONARY


Hx Respiratory Disorders: Yes


Hx Asthma: Yes


Hx Chronic Obstructive Pulmonary Disease (COPD): Yes





- NEUROLOGICAL


Hx Neurological Disorder: No





- HEENT


Hx HEENT Problems: No


Other/Comment: eye dryness,uses liquifilm at home





- RENAL


Hx Chronic Kidney Disease: No





- ENDOCRINE/METABOLIC


Hx Endocrine Disorders: Yes


Hx Diabetes Mellitus Type 1: Yes





- HEMATOLOGICAL/ONCOLOGICAL


Hx Blood Disorders: No





- INTEGUMENTARY


Hx Dermatological Problems: No


Other/Comment: whitish skin discoloration chest and part of abdomen appeared 

since long time ago





- MUSCULOSKELETAL/RHEUMATOLOGICAL


Hx Musculoskeletal Disorders: No


Hx Arthritis: Yes (LBP)


Hx Falls: No





- GASTROINTESTINAL


Hx Gastrointestinal Disorders: Yes


Hx Constipation: Yes (states she takes med to help with bowel movement (med from

[place]))


Hx Gall Bladder Disease: Yes (cholecystectomy)





- GENITOURINARY/GYNECOLOGICAL


Hx Genitourinary Disorders: No





- PSYCHIATRIC


Hx Psychophysiologic Disorder: No


Hx Substance Use: No





- SURGICAL HISTORY


Hx Surgeries: Yes


Hx Cholecystectomy: Yes





- ANESTHESIA


Hx Anesthesia: Yes


Hx Anesthesia Reactions: No


Hx Malignant Hyperthermia: No





Meds


Allergies/Adverse Reactions: 


                                    Allergies











Allergy/AdvReac Type Severity Reaction Status Date / Time


 


No Known Allergies Allergy   Verified 03/10/19 13:38














Physical Exam





- Constitutional


Appears: Non-toxic, In Acute Distress





- Eye Exam


Eye Exam: Normal appearance


Pupil Exam: NORMAL ACCOMODATION





- ENT Exam


ENT Exam: Mucous Membranes Moist





- Neck Exam


Neck exam: Positive for: Normal Inspection





- Respiratory Exam


Respiratory Exam: Clear to Auscultation Bilateral





- Cardiovascular Exam


Cardiovascular Exam: REGULAR RHYTHM





- GI/Abdominal Exam


GI & Abdominal Exam: Normal Bowel Sounds





- Extremities Exam


Extremities exam: Positive for: calf tenderness





- Back Exam


Back exam: NORMAL INSPECTION





- Neurological Exam


Neurological exam: Alert, Oriented x3





- Psychiatric Exam


Psychiatric exam: Normal Affect





- Skin


Skin Exam: Normal Color





Results





- Vital Signs


Recent Vital Signs: 





                                Last Vital Signs











Temp  97.7 F   03/11/19 16:00


 


Pulse  88   03/11/19 17:32


 


Resp  20   03/11/19 16:00


 


BP  106/62   03/11/19 16:00


 


Pulse Ox  97   03/11/19 16:00














- Labs


Result Diagrams: 


                                 03/11/19 10:25





                                 03/11/19 10:25


Labs: 





                         Laboratory Results - last 24 hr











  03/10/19 03/11/19 03/11/19





  21:14 06:38 10:25


 


WBC    18.0 H D


 


RBC    4.20


 


Hgb    11.9


 


Hct    37.9


 


MCV    90.2


 


MCH    28.4


 


MCHC    31.5 L


 


RDW    14.8 H


 


Plt Count    392


 


MPV    8.9


 


Neut % (Auto)    88.2 H


 


Lymph % (Auto)    7.9 L


 


Mono % (Auto)    3.7


 


Eos % (Auto)    0.0


 


Baso % (Auto)    0.2


 


Neut # (Auto)    15.9 H


 


Lymph # (Auto)    1.4


 


Mono # (Auto)    0.7


 


Eos # (Auto)    0.0


 


Baso # (Auto)    0.0


 


Neutrophils % (Manual)    88 H


 


Band Neutrophils %    2


 


Lymphocytes % (Manual)    6 L


 


Monocytes % (Manual)    4


 


Platelet Estimate    Normal


 


RBC Morphology    Normal


 


Sodium   


 


Potassium   


 


Chloride   


 


Carbon Dioxide   


 


Anion Gap   


 


BUN   


 


Creatinine   


 


Est GFR ( Amer)   


 


Est GFR (Non-Af Amer)   


 


POC Glucose (mg/dL)  207 H  155 H 


 


Random Glucose   


 


Calcium   


 


Phosphorus   


 


Magnesium   


 


Total Bilirubin   


 


AST   


 


ALT   


 


Alkaline Phosphatase   


 


Total Protein   


 


Albumin   


 


Globulin   


 


Albumin/Globulin Ratio   


 


Urine Color   


 


Urine Clarity   


 


Urine pH   


 


Ur Specific Gravity   


 


Urine Protein   


 


Urine Glucose (UA)   


 


Urine Ketones   


 


Urine Blood   


 


Urine Nitrate   


 


Urine Bilirubin   


 


Urine Urobilinogen   


 


Ur Leukocyte Esterase   


 


Urine WBC (Auto)   


 


Urine RBC (Auto)   


 


Ur Squamous Epith Cells   


 


Amorphous Sediment   


 


Urine Bacteria   


 


Influenza Typ A,B (EIA)   














  03/11/19 03/11/19 03/11/19





  10:25 11:22 12:44


 


WBC   


 


RBC   


 


Hgb   


 


Hct   


 


MCV   


 


MCH   


 


MCHC   


 


RDW   


 


Plt Count   


 


MPV   


 


Neut % (Auto)   


 


Lymph % (Auto)   


 


Mono % (Auto)   


 


Eos % (Auto)   


 


Baso % (Auto)   


 


Neut # (Auto)   


 


Lymph # (Auto)   


 


Mono # (Auto)   


 


Eos # (Auto)   


 


Baso # (Auto)   


 


Neutrophils % (Manual)   


 


Band Neutrophils %   


 


Lymphocytes % (Manual)   


 


Monocytes % (Manual)   


 


Platelet Estimate   


 


RBC Morphology   


 


Sodium  138  


 


Potassium  5.0  


 


Chloride  109 H  


 


Carbon Dioxide  24  


 


Anion Gap  10  


 


BUN  37 H  


 


Creatinine  1.7 H  


 


Est GFR ( Amer)  35  


 


Est GFR (Non-Af Amer)  29  


 


POC Glucose (mg/dL)   132 H 


 


Random Glucose  150 H D  


 


Calcium  9.1  


 


Phosphorus  3.9  


 


Magnesium  2.0  


 


Total Bilirubin  0.5  


 


AST  16  


 


ALT  17  


 


Alkaline Phosphatase  77  


 


Total Protein  7.3  


 


Albumin  3.7  


 


Globulin  3.6  


 


Albumin/Globulin Ratio  1.0  


 


Urine Color   


 


Urine Clarity   


 


Urine pH   


 


Ur Specific Gravity   


 


Urine Protein   


 


Urine Glucose (UA)   


 


Urine Ketones   


 


Urine Blood   


 


Urine Nitrate   


 


Urine Bilirubin   


 


Urine Urobilinogen   


 


Ur Leukocyte Esterase   


 


Urine WBC (Auto)   


 


Urine RBC (Auto)   


 


Ur Squamous Epith Cells   


 


Amorphous Sediment   


 


Urine Bacteria   


 


Influenza Typ A,B (EIA)    Negative for flu a/b














  03/11/19 03/11/19 03/11/19





  12:44 16:18 16:19


 


WBC   


 


RBC   


 


Hgb   


 


Hct   


 


MCV   


 


MCH   


 


MCHC   


 


RDW   


 


Plt Count   


 


MPV   


 


Neut % (Auto)   


 


Lymph % (Auto)   


 


Mono % (Auto)   


 


Eos % (Auto)   


 


Baso % (Auto)   


 


Neut # (Auto)   


 


Lymph # (Auto)   


 


Mono # (Auto)   


 


Eos # (Auto)   


 


Baso # (Auto)   


 


Neutrophils % (Manual)   


 


Band Neutrophils %   


 


Lymphocytes % (Manual)   


 


Monocytes % (Manual)   


 


Platelet Estimate   


 


RBC Morphology   


 


Sodium   


 


Potassium   


 


Chloride   


 


Carbon Dioxide   


 


Anion Gap   


 


BUN   


 


Creatinine   


 


Est GFR ( Amer)   


 


Est GFR (Non-Af Amer)   


 


POC Glucose (mg/dL)   51 L  44 L


 


Random Glucose   


 


Calcium   


 


Phosphorus   


 


Magnesium   


 


Total Bilirubin   


 


AST   


 


ALT   


 


Alkaline Phosphatase   


 


Total Protein   


 


Albumin   


 


Globulin   


 


Albumin/Globulin Ratio   


 


Urine Color  Yellow  


 


Urine Clarity  Hazy  


 


Urine pH  5.0  


 


Ur Specific Gravity  1.015  


 


Urine Protein  Negative  


 


Urine Glucose (UA)  Normal  


 


Urine Ketones  Negative  


 


Urine Blood  Negative  


 


Urine Nitrate  Negative  


 


Urine Bilirubin  Negative  


 


Urine Urobilinogen  Normal  


 


Ur Leukocyte Esterase  Trace  


 


Urine WBC (Auto)  5  


 


Urine RBC (Auto)  1  


 


Ur Squamous Epith Cells  6 H  


 


Amorphous Sediment  Rare H  


 


Urine Bacteria  Rare  


 


Influenza Typ A,B (EIA)   














  03/11/19 03/11/19 03/11/19





  16:58 17:04 17:05


 


WBC   


 


RBC   


 


Hgb   


 


Hct   


 


MCV   


 


MCH   


 


MCHC   


 


RDW   


 


Plt Count   


 


MPV   


 


Neut % (Auto)   


 


Lymph % (Auto)   


 


Mono % (Auto)   


 


Eos % (Auto)   


 


Baso % (Auto)   


 


Neut # (Auto)   


 


Lymph # (Auto)   


 


Mono # (Auto)   


 


Eos # (Auto)   


 


Baso # (Auto)   


 


Neutrophils % (Manual)   


 


Band Neutrophils %   


 


Lymphocytes % (Manual)   


 


Monocytes % (Manual)   


 


Platelet Estimate   


 


RBC Morphology   


 


Sodium   


 


Potassium   


 


Chloride   


 


Carbon Dioxide   


 


Anion Gap   


 


BUN   


 


Creatinine   


 


Est GFR ( Amer)   


 


Est GFR (Non-Af Amer)   


 


POC Glucose (mg/dL)  64 L  66  67


 


Random Glucose   


 


Calcium   


 


Phosphorus   


 


Magnesium   


 


Total Bilirubin   


 


AST   


 


ALT   


 


Alkaline Phosphatase   


 


Total Protein   


 


Albumin   


 


Globulin   


 


Albumin/Globulin Ratio   


 


Urine Color   


 


Urine Clarity   


 


Urine pH   


 


Ur Specific Gravity   


 


Urine Protein   


 


Urine Glucose (UA)   


 


Urine Ketones   


 


Urine Blood   


 


Urine Nitrate   


 


Urine Bilirubin   


 


Urine Urobilinogen   


 


Ur Leukocyte Esterase   


 


Urine WBC (Auto)   


 


Urine RBC (Auto)   


 


Ur Squamous Epith Cells   


 


Amorphous Sediment   


 


Urine Bacteria   


 


Influenza Typ A,B (EIA)   














  03/11/19





  17:42


 


WBC 


 


RBC 


 


Hgb 


 


Hct 


 


MCV 


 


MCH 


 


MCHC 


 


RDW 


 


Plt Count 


 


MPV 


 


Neut % (Auto) 


 


Lymph % (Auto) 


 


Mono % (Auto) 


 


Eos % (Auto) 


 


Baso % (Auto) 


 


Neut # (Auto) 


 


Lymph # (Auto) 


 


Mono # (Auto) 


 


Eos # (Auto) 


 


Baso # (Auto) 


 


Neutrophils % (Manual) 


 


Band Neutrophils % 


 


Lymphocytes % (Manual) 


 


Monocytes % (Manual) 


 


Platelet Estimate 


 


RBC Morphology 


 


Sodium 


 


Potassium 


 


Chloride 


 


Carbon Dioxide 


 


Anion Gap 


 


BUN 


 


Creatinine 


 


Est GFR ( Amer) 


 


Est GFR (Non-Af Amer) 


 


POC Glucose (mg/dL)  77


 


Random Glucose 


 


Calcium 


 


Phosphorus 


 


Magnesium 


 


Total Bilirubin 


 


AST 


 


ALT 


 


Alkaline Phosphatase 


 


Total Protein 


 


Albumin 


 


Globulin 


 


Albumin/Globulin Ratio 


 


Urine Color 


 


Urine Clarity 


 


Urine pH 


 


Ur Specific Gravity 


 


Urine Protein 


 


Urine Glucose (UA) 


 


Urine Ketones 


 


Urine Blood 


 


Urine Nitrate 


 


Urine Bilirubin 


 


Urine Urobilinogen 


 


Ur Leukocyte Esterase 


 


Urine WBC (Auto) 


 


Urine RBC (Auto) 


 


Ur Squamous Epith Cells 


 


Amorphous Sediment 


 


Urine Bacteria 


 


Influenza Typ A,B (EIA) 














Assessment & Plan





- Assessment and Plan (Free Text)


Assessment: 





dizziness weeknwss sob leg pain and swelling hypoglyceamia dm hx 


Plan: 





as ordered 





- Date & Time


Date: 03/11/19


Time: 19:22

## 2019-03-12 LAB
ALBUMIN SERPL-MCNC: 3.4 G/DL (ref 3.5–5)
ALBUMIN/GLOB SERPL: 1.1 {RATIO} (ref 1–2.1)
ALT SERPL-CCNC: 14 U/L (ref 9–52)
AST SERPL-CCNC: 18 U/L (ref 14–36)
BASOPHILS # BLD AUTO: 0.1 K/UL (ref 0–0.2)
BASOPHILS NFR BLD: 0.9 % (ref 0–2)
BILIRUB UR-MCNC: NEGATIVE MG/DL
BUN SERPL-MCNC: 37 MG/DL (ref 7–17)
CALCIUM SERPL-MCNC: 8.4 MG/DL (ref 8.6–10.4)
COLOR UR: YELLOW
EOSINOPHIL # BLD AUTO: 0.5 K/UL (ref 0–0.7)
EOSINOPHIL NFR BLD: 4.9 % (ref 0–4)
ERYTHROCYTE [DISTWIDTH] IN BLOOD BY AUTOMATED COUNT: 14.8 % (ref 11.5–14.5)
GFR NON-AFRICAN AMERICAN: 27
GLUCOSE UR STRIP-MCNC: NEGATIVE MG/DL
HGB BLD-MCNC: 11.6 G/DL (ref 11–16)
LEUKOCYTE ESTERASE UR-ACNC: NEGATIVE LEU/UL
LYMPHOCYTES # BLD AUTO: 1.9 K/UL (ref 1–4.3)
LYMPHOCYTES NFR BLD AUTO: 18.5 % (ref 20–40)
MCH RBC QN AUTO: 29.2 PG (ref 27–31)
MCHC RBC AUTO-ENTMCNC: 32.2 G/DL (ref 33–37)
MCV RBC AUTO: 90.4 FL (ref 81–99)
MONOCYTES # BLD: 0.7 K/UL (ref 0–0.8)
MONOCYTES NFR BLD: 6.5 % (ref 0–10)
NEUTROPHILS # BLD: 7.1 K/UL (ref 1.8–7)
NEUTROPHILS NFR BLD AUTO: 69.2 % (ref 50–75)
NRBC BLD AUTO-RTO: 0 % (ref 0–2)
PH UR STRIP: 5.5 [PH] (ref 5–8)
PLATELET # BLD: 339 K/UL (ref 130–400)
PMV BLD AUTO: 9.2 FL (ref 7.2–11.7)
PROT UR STRIP-MCNC: NEGATIVE MG/DL
RBC # BLD AUTO: 3.99 MIL/UL (ref 3.8–5.2)
RBC # UR STRIP: NEGATIVE /UL
SP GR UR STRIP: 1.01 (ref 1–1.03)
URINE CLARITY: (no result)
UROBILINOGEN UR-MCNC: 0.2 MG/DL (ref 0.2–1)
WBC # BLD AUTO: 10.2 K/UL (ref 4.8–10.8)

## 2019-03-12 RX ADMIN — FLUTICASONE FUROATE AND VILANTEROL TRIFENATATE SCH: 100; 25 POWDER RESPIRATORY (INHALATION) at 11:39

## 2019-03-12 RX ADMIN — GUAIFENESIN SCH MG: 600 TABLET, EXTENDED RELEASE ORAL at 11:15

## 2019-03-12 RX ADMIN — ENOXAPARIN SODIUM SCH MG: 30 INJECTION SUBCUTANEOUS at 11:15

## 2019-03-12 RX ADMIN — GUAIFENESIN SCH MG: 600 TABLET, EXTENDED RELEASE ORAL at 17:32

## 2019-03-12 RX ADMIN — INSULIN ASPART SCH: 100 INJECTION, SOLUTION INTRAVENOUS; SUBCUTANEOUS at 11:52

## 2019-03-12 RX ADMIN — INSULIN ASPART SCH: 100 INJECTION, SOLUTION INTRAVENOUS; SUBCUTANEOUS at 21:26

## 2019-03-12 RX ADMIN — TOLTERODINE TARTRATE SCH MG: 2 CAPSULE, EXTENDED RELEASE ORAL at 11:14

## 2019-03-12 RX ADMIN — Medication SCH TAB: at 11:13

## 2019-03-12 RX ADMIN — INSULIN ASPART SCH: 100 INJECTION, SOLUTION INTRAVENOUS; SUBCUTANEOUS at 16:47

## 2019-03-12 RX ADMIN — STANDARDIZED SENNA CONCENTRATE AND DOCUSATE SODIUM SCH TAB: 8.6; 5 TABLET, FILM COATED ORAL at 11:15

## 2019-03-12 RX ADMIN — CEFEPIME SCH MLS/HR: 1 INJECTION, SOLUTION INTRAVENOUS at 11:51

## 2019-03-12 RX ADMIN — INSULIN ASPART SCH: 100 INJECTION, SOLUTION INTRAVENOUS; SUBCUTANEOUS at 07:18

## 2019-03-12 NOTE — CP.PCM.CON
History of Present Illness





- History of Present Illness


History of Present Illness: 





82 y/o female, with history of mild diabetes, comes in complaining of SOB since 

last night, but no associated chest pain, contrary to triage note. Patient 

reports she gets SOB when she coughs, but otherwise she denies fever, chills, vo

miting, or abdominal pain. As per , patient has O2 at home when she gets 

SOB. Patient was seen in ED 2 months ago for pneumonia and discharged home. PMD:

Roxie Duran 





c/o of cough and congestion: no fevers or chills


Mildly active with ADLS, no reported chest heaviness or pressure, no palpit

ation, syncope or diaphoresis





PMHX


No hx of CAD/CVA or MI


DM chronic mild


CKD stage 3-4


LIPIDS; mild on statin


BP mild on losartan





Review of Systems





- Review of Systems


All systems: reviewed and no additional remarkable complaints except





Past Patient History





- Past Medical History & Family History


Past Medical History?: Yes





- Past Social History


Smoking Status: Never Smoked





- CARDIAC


Hx Cardiac Disorders: Yes


Hx Hypertension: Yes





- PULMONARY


Hx Respiratory Disorders: Yes


Hx Asthma: Yes


Hx Chronic Obstructive Pulmonary Disease (COPD): Yes





- NEUROLOGICAL


Hx Neurological Disorder: No





- HEENT


Hx HEENT Problems: No


Other/Comment: eye dryness,uses liquifilm at home





- RENAL


Hx Chronic Kidney Disease: No





- ENDOCRINE/METABOLIC


Hx Endocrine Disorders: Yes


Hx Diabetes Mellitus Type 1: Yes





- HEMATOLOGICAL/ONCOLOGICAL


Hx Blood Disorders: No





- INTEGUMENTARY


Hx Dermatological Problems: No


Other/Comment: whitish skin discoloration chest and part of abdomen appeared si

nce long time ago





- MUSCULOSKELETAL/RHEUMATOLOGICAL


Hx Musculoskeletal Disorders: No


Hx Arthritis: Yes (LBP)


Hx Falls: No





- GASTROINTESTINAL


Hx Gastrointestinal Disorders: Yes


Hx Constipation: Yes (states she takes med to help with bowel movement (med from

[place]))


Hx Gall Bladder Disease: Yes (cholecystectomy)





- GENITOURINARY/GYNECOLOGICAL


Hx Genitourinary Disorders: No





- PSYCHIATRIC


Hx Psychophysiologic Disorder: No


Hx Substance Use: No





- SURGICAL HISTORY


Hx Surgeries: Yes


Hx Cholecystectomy: Yes





- ANESTHESIA


Hx Anesthesia: Yes


Hx Anesthesia Reactions: No


Hx Malignant Hyperthermia: No





Meds


Allergies/Adverse Reactions: 


                                    Allergies











Allergy/AdvReac Type Severity Reaction Status Date / Time


 


No Known Allergies Allergy   Verified 03/10/19 13:38














- Medications


Medications: 


                               Current Medications





Dextrose (Dextrose 50% Inj)  0 ml IV STAT PRN; Protocol


   PRN Reason: Hypoglycemia Protocol


Dextrose (Glutose 15)  0 gm PO ONCE PRN; Protocol


   PRN Reason: Hypoglycemia Protocol


Enoxaparin Sodium (Lovenox)  30 mg SC DAILY [facility]


   Last Admin: 03/12/19 11:15 Dose:  30 mg


Fluticasone/Vilanterol (Breo Ellipta 100-25 Mcg Inh)  1 puff INH RQ24 [facility]


   Last Admin: 03/12/19 11:39 Dose:  Not Given


Glucagon (Glucagen Diagnostic Kit)  0 mg IM STAT PRN; Protocol


   PRN Reason: Hypoglycemia Protocol


Guaifenesin (Mucinex La)  600 mg PO BID [facility]


   Last Admin: 03/12/19 11:15 Dose:  600 mg


Sodium Chloride (Sodium Chloride 0.45%)  1,000 mls @ 60 mls/hr IV .[id number] [facility]


   Last Admin: 03/12/19 08:57 Dose:  60 mls/hr


Cefepime HCl (Maxipime Iv 1 Gm Premix)  1 gm in 50 mls @ 100 mls/hr IVPB Q12H 

[facility]; Protocol


   Last Admin: 03/12/19 11:51 Dose:  100 mls/hr


Dextrose (Dextrose 5% In Water 1000 Ml)  1,000 mls @ 0 mls/hr IV .Q0M PRN; 

Protocol


   PRN Reason: Hypoglycemia Protocol


Insulin Aspart (Novolog)  0 unit SC ACHS [facility]; Protocol


   Last Admin: 03/12/19 16:47 Dose:  Not Given


Lactulose (Enulose)  20 gm PO DAILY [facility]


   Last Admin: 03/12/19 11:15 Dose:  20 gm


Loratadine (Claritin)  10 mg PO DAILY [facility]


   Last Admin: 03/12/19 11:15 Dose:  10 mg


Losartan Potassium (Cozaar)  100 mg PO DAILY [facility]


   Last Admin: 03/12/19 11:13 Dose:  Not Given


Multivitamins (Hexavitamin)  1 tab PO DAILY [facility]


   Last Admin: 03/12/19 11:13 Dose:  1 tab


Rosuvastatin Calcium (Crestor)  5 mg PO HS [facility]


   Last Admin: 03/11/19 22:24 Dose:  5 mg


Senna/Docusate Sodium (Senokot S 50 Mg-8.6 Mg)  1 tab PO DAILY [facility]


   Last Admin: 03/12/19 11:15 Dose:  1 tab


Tolterodine Tartrate (Detrol La)  2 mg PO DAILY [facility]


   Last Admin: 03/12/19 11:14 Dose:  2 mg











Physical Exam





- Constitutional


Appears: No Acute Distress





- Head Exam


Head Exam: ATRAUMATIC, NORMAL INSPECTION, NORMOCEPHALIC





- Eye Exam


Eye Exam: EOMI, Normal appearance.  absent: Scleral icterus





- ENT Exam


ENT Exam: Mucous Membranes Moist, Normal Oropharynx





- Neck Exam


Neck exam: Positive for: Normal Inspection.  Negative for: Tenderness, 

Thyromegaly





- Respiratory Exam


Respiratory Exam: Rhonchi, Wheezes, NORMAL BREATHING PATTERN





- Cardiovascular Exam


Cardiovascular Exam: REGULAR RHYTHM, +S1, +S2.  absent: +S4, Systolic Murmur





- GI/Abdominal Exam


GI & Abdominal Exam: Normal Bowel Sounds, Soft.  absent: Tenderness





- Extremities Exam


Extremities exam: Positive for: normal inspection.  Negative for: calf tend

erness, pedal edema





- Neurological Exam


Neurological exam: Alert, Oriented x3





- Psychiatric Exam


Psychiatric exam: Normal Affect, Normal Mood





- Skin


Skin Exam: Normal Color, Warm





Results





- Vital Signs


Recent Vital Signs: 


                                Last Vital Signs











Temp  97.9 F   03/12/19 15:00


 


Pulse  87   03/12/19 16:43


 


Resp  20   03/12/19 15:00


 


BP  122/73   03/12/19 15:00


 


Pulse Ox  96   03/12/19 15:00














- Labs


Result Diagrams: 


                                 03/12/19 12:04





                                 03/12/19 12:04


Labs: 


                         Laboratory Results - last 24 hr











  03/11/19 03/11/19 03/11/19





  16:58 17:04 17:05


 


WBC   


 


RBC   


 


Hgb   


 


Hct   


 


MCV   


 


MCH   


 


MCHC   


 


RDW   


 


Plt Count   


 


MPV   


 


Neut % (Auto)   


 


Lymph % (Auto)   


 


Mono % (Auto)   


 


Eos % (Auto)   


 


Baso % (Auto)   


 


Neut # (Auto)   


 


Lymph # (Auto)   


 


Mono # (Auto)   


 


Eos # (Auto)   


 


Baso # (Auto)   


 


Sodium   


 


Potassium   


 


Chloride   


 


Carbon Dioxide   


 


Anion Gap   


 


BUN   


 


Creatinine   


 


Est GFR ( Amer)   


 


Est GFR (Non-Af Amer)   


 


POC Glucose (mg/dL)  64 L  66  67


 


Random Glucose   


 


Calcium   


 


Total Bilirubin   


 


AST   


 


ALT   


 


Alkaline Phosphatase   


 


Total Protein   


 


Albumin   


 


Globulin   


 


Albumin/Globulin Ratio   


 


Urine Color   


 


Urine Clarity   


 


Urine pH   


 


Ur Specific Gravity   


 


Urine Protein   


 


Urine Glucose (UA)   


 


Urine Ketones   


 


Urine Blood   


 


Urine Nitrate   


 


Urine Bilirubin   


 


Urine Urobilinogen   


 


Ur Leukocyte Esterase   


 


Urine RBC (Auto)   














  03/11/19 03/11/19 03/11/19





  17:42 20:21 21:07


 


WBC   18.7 H 


 


RBC   3.89 


 


Hgb   11.1 


 


Hct   35.0 


 


MCV   89.9 


 


MCH   28.5 


 


MCHC   31.7 L 


 


RDW   14.5 


 


Plt Count   368 


 


MPV   9.1 


 


Neut % (Auto)   


 


Lymph % (Auto)   


 


Mono % (Auto)   


 


Eos % (Auto)   


 


Baso % (Auto)   


 


Neut # (Auto)   


 


Lymph # (Auto)   


 


Mono # (Auto)   


 


Eos # (Auto)   


 


Baso # (Auto)   


 


Sodium   


 


Potassium   


 


Chloride   


 


Carbon Dioxide   


 


Anion Gap   


 


BUN   


 


Creatinine   


 


Est GFR ( Amer)   


 


Est GFR (Non-Af Amer)   


 


POC Glucose (mg/dL)  77   111 H


 


Random Glucose   


 


Calcium   


 


Total Bilirubin   


 


AST   


 


ALT   


 


Alkaline Phosphatase   


 


Total Protein   


 


Albumin   


 


Globulin   


 


Albumin/Globulin Ratio   


 


Urine Color   


 


Urine Clarity   


 


Urine pH   


 


Ur Specific Gravity   


 


Urine Protein   


 


Urine Glucose (UA)   


 


Urine Ketones   


 


Urine Blood   


 


Urine Nitrate   


 


Urine Bilirubin   


 


Urine Urobilinogen   


 


Ur Leukocyte Esterase   


 


Urine RBC (Auto)   














  03/12/19 03/12/19 03/12/19





  01:21 02:08 06:34


 


WBC   


 


RBC   


 


Hgb   


 


Hct   


 


MCV   


 


MCH   


 


MCHC   


 


RDW   


 


Plt Count   


 


MPV   


 


Neut % (Auto)   


 


Lymph % (Auto)   


 


Mono % (Auto)   


 


Eos % (Auto)   


 


Baso % (Auto)   


 


Neut # (Auto)   


 


Lymph # (Auto)   


 


Mono # (Auto)   


 


Eos # (Auto)   


 


Baso # (Auto)   


 


Sodium   


 


Potassium   


 


Chloride   


 


Carbon Dioxide   


 


Anion Gap   


 


BUN   


 


Creatinine   


 


Est GFR ( Amer)   


 


Est GFR (Non-Af Amer)   


 


POC Glucose (mg/dL)   146 H  110


 


Random Glucose   


 


Calcium   


 


Total Bilirubin   


 


AST   


 


ALT   


 


Alkaline Phosphatase   


 


Total Protein   


 


Albumin   


 


Globulin   


 


Albumin/Globulin Ratio   


 


Urine Color  Yellow  


 


Urine Clarity  Hazy  


 


Urine pH  5.5  


 


Ur Specific Gravity  1.010  


 


Urine Protein  Negative  


 


Urine Glucose (UA)  Negative  


 


Urine Ketones  Negative  


 


Urine Blood  Negative  


 


Urine Nitrate  Negative  


 


Urine Bilirubin  Negative  


 


Urine Urobilinogen  0.2  


 


Ur Leukocyte Esterase  Negative  


 


Urine RBC (Auto)  2  














  03/12/19 03/12/19 03/12/19





  11:26 12:04 12:04


 


WBC   10.2 


 


RBC   3.99 


 


Hgb   11.6 


 


Hct   36.1 


 


MCV   90.4 


 


MCH   29.2 


 


MCHC   32.2 L 


 


RDW   14.8 H 


 


Plt Count   339 


 


MPV   9.2 


 


Neut % (Auto)   69.2 


 


Lymph % (Auto)   18.5 L 


 


Mono % (Auto)   6.5 


 


Eos % (Auto)   4.9 H 


 


Baso % (Auto)   0.9 


 


Neut # (Auto)   7.1 H 


 


Lymph # (Auto)   1.9 


 


Mono # (Auto)   0.7 


 


Eos # (Auto)   0.5 


 


Baso # (Auto)   0.1 


 


Sodium    138


 


Potassium    4.5


 


Chloride    105


 


Carbon Dioxide    26


 


Anion Gap    11


 


BUN    37 H


 


Creatinine    1.8 H


 


Est GFR ( Amer)    33


 


Est GFR (Non-Af Amer)    27


 


POC Glucose (mg/dL)  184 H  


 


Random Glucose    150 H


 


Calcium    8.4 L


 


Total Bilirubin    0.2


 


AST    18


 


ALT    14


 


Alkaline Phosphatase    87


 


Total Protein    6.6


 


Albumin    3.4 L


 


Globulin    3.2


 


Albumin/Globulin Ratio    1.1


 


Urine Color   


 


Urine Clarity   


 


Urine pH   


 


Ur Specific Gravity   


 


Urine Protein   


 


Urine Glucose (UA)   


 


Urine Ketones   


 


Urine Blood   


 


Urine Nitrate   


 


Urine Bilirubin   


 


Urine Urobilinogen   


 


Ur Leukocyte Esterase   


 


Urine RBC (Auto)   














  03/12/19





  16:17


 


WBC 


 


RBC 


 


Hgb 


 


Hct 


 


MCV 


 


MCH 


 


MCHC 


 


RDW 


 


Plt Count 


 


MPV 


 


Neut % (Auto) 


 


Lymph % (Auto) 


 


Mono % (Auto) 


 


Eos % (Auto) 


 


Baso % (Auto) 


 


Neut # (Auto) 


 


Lymph # (Auto) 


 


Mono # (Auto) 


 


Eos # (Auto) 


 


Baso # (Auto) 


 


Sodium 


 


Potassium 


 


Chloride 


 


Carbon Dioxide 


 


Anion Gap 


 


BUN 


 


Creatinine 


 


Est GFR ( Amer) 


 


Est GFR (Non-Af Amer) 


 


POC Glucose (mg/dL)  141 H


 


Random Glucose 


 


Calcium 


 


Total Bilirubin 


 


AST 


 


ALT 


 


Alkaline Phosphatase 


 


Total Protein 


 


Albumin 


 


Globulin 


 


Albumin/Globulin Ratio 


 


Urine Color 


 


Urine Clarity 


 


Urine pH 


 


Ur Specific Gravity 


 


Urine Protein 


 


Urine Glucose (UA) 


 


Urine Ketones 


 


Urine Blood 


 


Urine Nitrate 


 


Urine Bilirubin 


 


Urine Urobilinogen 


 


Ur Leukocyte Esterase 


 


Urine RBC (Auto) 














- EKG Data


EKG Interpreted by: Myself





- Imaging and Cardiology


  ** Chest x-ray


Status: Image reviewed by me





Assessment & Plan





- Assessment and Plan (Free Text)


Assessment: 





I have ordered and directly viewqed the imaging on the following:


- EKG: NSR, no acute changes


- ECHO: Normal LVEF, mild LVH, grade 1 DD, normal PASP, No significant valvular 

regurge


- V/Q: Low prob for PE


- CXR: No infiltrate or effusions








82 y/o with cough, congestion and mild wheeze with chest wall pain // DM mild 

uncontrolled // HTN: controlled


- sx's are c/w acute bronchitis


- no evidence for ACS, normal LVEF and wall motion, normal EKG


- clinically no volume overload and low prob V/Q





Plan: 


1. cont losartan and statin therapy, suggest add asa 81 daily


2. cont pulmonary supportive care


3. adjust DM medications


4. f/u LE doppler


5. Suggest f/u as outpatient to continue cardiovascular risk stratification.

## 2019-03-12 NOTE — CP.PCM.PN
Subjective





- Date & Time of Evaluation


Date of Evaluation: 03/12/19


Time of Evaluation: 09:00





- Subjective


Subjective: 





V/Q neg








improving


cultures blood neg so far 





Objective





- Vital Signs/Intake and Output


Vital Signs (last 24 hours): 


                                        











Temp Pulse Resp BP Pulse Ox


 


 97.9 F   68   18   125/69   98 


 


 03/12/19 07:15  03/12/19 07:18  03/12/19 07:15  03/12/19 07:15  03/12/19 07:15








Intake and Output: 


                                        











 03/12/19 03/12/19





 06:59 18:59


 


Intake Total 1180 


 


Balance 1180 














- Medications


Medications: 


                               Current Medications





Dextrose (Dextrose 50% Inj)  0 ml IV STAT PRN; Protocol


   PRN Reason: Hypoglycemia Protocol


Dextrose (Glutose 15)  0 gm PO ONCE PRN; Protocol


   PRN Reason: Hypoglycemia Protocol


Enoxaparin Sodium (Lovenox)  30 mg SC DAILY [facility]


   Last Admin: 03/12/19 11:15 Dose:  30 mg


Fluticasone/Vilanterol (Breo Ellipta 100-25 Mcg Inh)  1 puff INH RQ24 [facility]


   Last Admin: 03/11/19 11:25 Dose:  Not Given


Glucagon (Glucagen Diagnostic Kit)  0 mg IM STAT PRN; Protocol


   PRN Reason: Hypoglycemia Protocol


Guaifenesin (Mucinex La)  600 mg PO BID [facility]


   Last Admin: 03/12/19 11:15 Dose:  600 mg


Sodium Chloride (Sodium Chloride 0.45%)  1,000 mls @ 60 mls/hr IV .[id number] [facility]


   Last Admin: 03/12/19 08:57 Dose:  60 mls/hr


Cefepime HCl (Maxipime Iv 1 Gm Premix)  1 gm in 50 mls @ 100 mls/hr IVPB Q12H 

[facility]; Protocol


   Last Admin: 03/11/19 23:53 Dose:  100 mls/hr


Dextrose (Dextrose 5% In Water 1000 Ml)  1,000 mls @ 0 mls/hr IV .Q0M PRN; 

Protocol


   PRN Reason: Hypoglycemia Protocol


Insulin Aspart (Novolog)  0 unit SC ACHS [facility]; Protocol


   Last Admin: 03/12/19 07:18 Dose:  Not Given


Lactulose (Enulose)  20 gm PO DAILY [facility]


   Last Admin: 03/12/19 11:15 Dose:  20 gm


Loratadine (Claritin)  10 mg PO DAILY [facility]


   Last Admin: 03/12/19 11:15 Dose:  10 mg


Losartan Potassium (Cozaar)  100 mg PO DAILY [facility]


   Last Admin: 03/12/19 11:13 Dose:  Not Given


Multivitamins (Hexavitamin)  1 tab PO DAILY [facility]


   Last Admin: 03/12/19 11:13 Dose:  1 tab


Rosuvastatin Calcium (Crestor)  5 mg PO HS [facility]


   Last Admin: 03/11/19 22:24 Dose:  5 mg


Senna/Docusate Sodium (Senokot S 50 Mg-8.6 Mg)  1 tab PO DAILY [facility]


   Last Admin: 03/12/19 11:15 Dose:  1 tab


Tolterodine Tartrate (Detrol La)  2 mg PO DAILY [facility]


   Last Admin: 03/12/19 11:14 Dose:  2 mg











- Labs


Labs: 


                                        





                                 03/11/19 20:21 





                                 03/11/19 10:25 





                                        











PT  12.5 SECONDS (9.7-12.2)  H  03/10/19  14:04    


 


INR  1.1   03/10/19  14:04    


 


APTT  31 SECONDS (21-34)   03/10/19  14:04    














- Constitutional


Appears: Non-toxic, Chronically Ill





- Head Exam


Head Exam: NORMOCEPHALIC





- Eye Exam


Eye Exam: absent: Scleral icterus





- ENT Exam


ENT Exam: Mucous Membranes Dry





- Neck Exam


Neck Exam: absent: Lymphadenopathy





- Respiratory Exam


Respiratory Exam: Decreased Breath Sounds, Rhonchi





- Cardiovascular Exam


Cardiovascular Exam: REGULAR RHYTHM





- GI/Abdominal Exam


GI & Abdominal Exam: Distended, Soft





- Rectal Exam


Rectal Exam: Deferred





-  Exam


 Exam: NORMAL INSPECTION





- Extremities Exam


Extremities Exam: Pedal Edema





- Back Exam


Back Exam: absent: CVA tenderness (L), CVA tenderness (R)





- Neurological Exam


Neurological Exam: Alert, Awake, CN II-XII Intact





- Psychiatric Exam


Psychiatric exam: Depressed





- Skin


Skin Exam: Dry





Assessment and Plan


(1) Dyspnea


Status: Acute   





(2) Leg swelling


Status: Acute   





(3) Leukocytosis


Status: Acute   





- Assessment and Plan (Free Text)


Assessment: 





no evidence of PE


cardio eval pending


'cultures so far negative

## 2019-03-12 NOTE — CP.PCM.PN
Subjective





- Date & Time of Evaluation


Date of Evaluation: 03/12/19


Time of Evaluation: 10:37





- Subjective


Subjective: 





pt feels beter





Objective





- Vital Signs/Intake and Output


Vital Signs (last 24 hours): 


                                        











Temp Pulse Resp BP Pulse Ox


 


 97.9 F   68   18   125/69   98 


 


 03/12/19 07:15  03/12/19 07:18  03/12/19 07:15  03/12/19 07:15  03/12/19 07:15








Intake and Output: 


                                        











 03/12/19 03/12/19





 06:59 18:59


 


Intake Total 1180 


 


Balance 1180 














- Medications


Medications: 


                               Current Medications





Dextrose (Dextrose 50% Inj)  0 ml IV STAT PRN; Protocol


   PRN Reason: Hypoglycemia Protocol


Dextrose (Glutose 15)  0 gm PO ONCE PRN; Protocol


   PRN Reason: Hypoglycemia Protocol


Enoxaparin Sodium (Lovenox)  [facility]ILY [facility]


Fluticasone/Vilanterol (Breo Ellipta 100-25 Mcg Inh)  1 puff INH RQ24 [facility]


   Last Admin: 03/11/19 11:25 Dose:  Not Given


Glucagon (Glucagen Diagnostic Kit)  0 mg IM STAT PRN; Protocol


   PRN Reason: Hypoglycemia Protocol


Guaifenesin (Mucinex La)  600 mg PO BID [facility]


   Last Admin: 03/11/19 17:23 Dose:  600 mg


Sodium Chloride (Sodium Chloride 0.45%)  1,000 mls @ 60 mls/hr IV .[id number] [facility]


   Last Admin: 03/12/19 08:57 Dose:  60 mls/hr


Cefepime HCl (Maxipime Iv 1 Gm Premix)  1 gm in 50 mls @ 100 mls/hr IVPB Q12H 

[facility]; Protocol


   Last Admin: 03/11/19 23:53 Dose:  100 mls/hr


Dextrose (Dextrose 5% In Water 1000 Ml)  1,000 mls @ 0 mls/hr IV .Q0M PRN; 

Protocol


   PRN Reason: Hypoglycemia Protocol


Insulin Aspart (Novolog)  0 unit SC ACHS [facility]; Protocol


   Last Admin: 03/12/19 07:18 Dose:  Not Given


Lactulose (Enulose)  20 gm PO DAILY [facility]


   Last Admin: 03/11/19 10:28 Dose:  20 gm


Loratadine (Claritin)  10 mg PO DAILY [facility]


   Last Admin: 03/11/19 10:31 Dose:  10 mg


Losartan Potassium (Cozaar)  100 mg PO DAILY [facility]


   Last Admin: 03/11/19 10:30 Dose:  100 mg


Multivitamins (Hexavitamin)  1 tab PO DAILY [facility]


   Last Admin: 03/11/19 10:30 Dose:  1 tab


Rosuvastatin Calcium (Crestor)  5 mg PO HS [facility]


   Last Admin: 03/11/19 22:24 Dose:  5 mg


Senna/Docusate Sodium (Senokot S 50 Mg-8.6 Mg)  1 tab PO DAILY [facility]


   Last Admin: 03/11/19 15:03 Dose:  1 tab


Tolterodine Tartrate (Detrol La)  2 mg PO DAILY [facility]











- Labs


Labs: 


                                        





                                 03/11/19 20:21 





                                 03/11/19 10:25 





                                        











PT  12.5 SECONDS (9.7-12.2)  H  03/10/19  14:04    


 


INR  1.1   03/10/19  14:04    


 


APTT  31 SECONDS (21-34)   03/10/19  14:04    














- Constitutional


Appears: Non-toxic





- Head Exam


Head Exam: ATRAUMATIC





- Eye Exam


Eye Exam: Normal appearance


Pupil Exam: NORMAL ACCOMODATION





- ENT Exam


ENT Exam: Mucous Membranes Moist





- Neck Exam


Neck Exam: Full ROM





- Respiratory Exam


Respiratory Exam: Clear to Ausculation Bilateral





- Cardiovascular Exam


Cardiovascular Exam: REGULAR RHYTHM





- GI/Abdominal Exam


GI & Abdominal Exam: Normal Bowel Sounds





- Rectal Exam


Rectal Exam: NORMAL INSPECTION





-  Exam


 Exam: NORMAL INSPECTION





- Extremities Exam


Extremities Exam: Normal Inspection





- Back Exam


Back Exam: NORMAL INSPECTION





- Neurological Exam


Neurological Exam: Awake





Assessment and Plan





- Assessment and Plan (Free Text)


Assessment: 





lecocytosis cough leg pains for venous doppler today continu antibiotics 


Plan: 





repeate cbc

## 2019-03-13 VITALS
RESPIRATION RATE: 20 BRPM | DIASTOLIC BLOOD PRESSURE: 75 MMHG | OXYGEN SATURATION: 97 % | HEART RATE: 75 BPM | SYSTOLIC BLOOD PRESSURE: 117 MMHG

## 2019-03-13 VITALS — TEMPERATURE: 97.9 F

## 2019-03-13 LAB
BUN SERPL-MCNC: 34 MG/DL (ref 7–17)
CALCIUM SERPL-MCNC: 9 MG/DL (ref 8.6–10.4)
ERYTHROCYTE [DISTWIDTH] IN BLOOD BY AUTOMATED COUNT: 14.6 % (ref 11.5–14.5)
GFR NON-AFRICAN AMERICAN: 27
HGB BLD-MCNC: 12 G/DL (ref 11–16)
MCH RBC QN AUTO: 29.1 PG (ref 27–31)
MCHC RBC AUTO-ENTMCNC: 32.1 G/DL (ref 33–37)
MCV RBC AUTO: 90.8 FL (ref 81–99)
PLATELET # BLD: 340 K/UL (ref 130–400)
PMV BLD AUTO: 9.4 FL (ref 7.2–11.7)
RBC # BLD AUTO: 4.13 MIL/UL (ref 3.8–5.2)
WBC # BLD AUTO: 9.1 K/UL (ref 4.8–10.8)

## 2019-03-13 RX ADMIN — GUAIFENESIN SCH MG: 600 TABLET, EXTENDED RELEASE ORAL at 09:43

## 2019-03-13 RX ADMIN — CEFEPIME SCH MLS/HR: 1 INJECTION, SOLUTION INTRAVENOUS at 12:38

## 2019-03-13 RX ADMIN — Medication SCH TAB: at 09:43

## 2019-03-13 RX ADMIN — ENOXAPARIN SODIUM SCH MG: 30 INJECTION SUBCUTANEOUS at 09:47

## 2019-03-13 RX ADMIN — INSULIN ASPART SCH: 100 INJECTION, SOLUTION INTRAVENOUS; SUBCUTANEOUS at 12:37

## 2019-03-13 RX ADMIN — FLUTICASONE FUROATE AND VILANTEROL TRIFENATATE SCH: 100; 25 POWDER RESPIRATORY (INHALATION) at 08:29

## 2019-03-13 RX ADMIN — INSULIN ASPART SCH: 100 INJECTION, SOLUTION INTRAVENOUS; SUBCUTANEOUS at 07:30

## 2019-03-13 RX ADMIN — STANDARDIZED SENNA CONCENTRATE AND DOCUSATE SODIUM SCH TAB: 8.6; 5 TABLET, FILM COATED ORAL at 10:28

## 2019-03-13 RX ADMIN — CEFEPIME SCH MLS/HR: 1 INJECTION, SOLUTION INTRAVENOUS at 00:13

## 2019-03-13 RX ADMIN — TOLTERODINE TARTRATE SCH MG: 2 CAPSULE, EXTENDED RELEASE ORAL at 10:28

## 2019-03-13 NOTE — CP.PCM.PN
Subjective





- Date & Time of Evaluation


Date of Evaluation: 03/13/19


Time of Evaluation: 10:53





- Subjective


Subjective: 





pt  feels good no pain no sob no cough no dvt will discharge





Objective





- Vital Signs/Intake and Output


Vital Signs (last 24 hours): 


                                        











Temp Pulse Resp BP Pulse Ox


 


 97.9 F   74   18   120/73   96 


 


 03/13/19 07:00  03/13/19 07:00  03/13/19 07:00  03/13/19 07:00  03/13/19 07:00








Intake and Output: 


                                        











 03/13/19 03/13/19





 06:59 18:59


 


Intake Total 820 


 


Balance 820 














- Medications


Medications: 


                               Current Medications





Dextrose (Dextrose 50% Inj)  0 ml IV STAT PRN; Protocol


   PRN Reason: Hypoglycemia Protocol


Dextrose (Glutose 15)  0 gm PO ONCE PRN; Protocol


   PRN Reason: Hypoglycemia Protocol


Enoxaparin Sodium (Lovenox)  30 mg SC DAILY [facility]


   Last Admin: 03/13/19 09:47 Dose:  30 mg


Fluticasone/Vilanterol (Breo Ellipta 100-25 Mcg Inh)  1 puff INH RQ24 [facility]


   Last Admin: 03/13/19 08:29 Dose:  Not Given


Glucagon (Glucagen Diagnostic Kit)  0 mg IM STAT PRN; Protocol


   PRN Reason: Hypoglycemia Protocol


Guaifenesin (Mucinex La)  600 mg PO BID [facility]


   Last Admin: 03/13/19 09:43 Dose:  600 mg


Sodium Chloride (Sodium Chloride 0.45%)  1,000 mls @ 60 mls/hr IV .[id number] [facility]


   Last Admin: 03/12/19 19:52 Dose:  Not Given


Cefepime HCl (Maxipime Iv 1 Gm Premix)  1 gm in 50 mls @ 100 mls/hr IVPB Q12H 

[facility]; Protocol


   Last Admin: 03/13/19 00:13 Dose:  100 mls/hr


Dextrose (Dextrose 5% In Water 1000 Ml)  1,000 mls @ 0 mls/hr IV .Q0M PRN; 

Protocol


   PRN Reason: Hypoglycemia Protocol


Insulin Aspart (Novolog)  0 unit SC ACHS [facility]; Protocol


   Last Admin: 03/13/19 07:30 Dose:  Not Given


Lactulose (Enulose)  20 gm PO DAILY [facility]


   Last Admin: 03/13/19 09:43 Dose:  20 gm


Loratadine (Claritin)  10 mg PO DAILY [facility]


   Last Admin: 03/13/19 09:43 Dose:  10 mg


Losartan Potassium (Cozaar)  100 mg PO DAILY [facility]


   Last Admin: 03/13/19 09:43 Dose:  100 mg


Multivitamins (Hexavitamin)  1 tab PO DAILY [facility]


   Last Admin: 03/13/19 09:43 Dose:  1 tab


Rosuvastatin Calcium (Crestor)  5 mg PO HS [facility]


   Last Admin: 03/12/19 21:04 Dose:  5 mg


Senna/Docusate Sodium (Senokot S 50 Mg-8.6 Mg)  1 tab PO DAILY [facility]


   Last Admin: 03/13/19 10:28 Dose:  1 tab


Tolterodine Tartrate (Detrol La)  2 mg PO DAILY [facility]


   Last Admin: 03/13/19 10:28 Dose:  2 mg











- Labs


Labs: 


                                        





                                 03/12/19 12:04 





                                 03/12/19 12:04 





                                        











PT  12.5 SECONDS (9.7-12.2)  H  03/10/19  14:04    


 


INR  1.1   03/10/19  14:04    


 


APTT  31 SECONDS (21-34)   03/10/19  14:04    














- Constitutional


Appears: Non-toxic





- Head Exam


Head Exam: ATRAUMATIC





- Eye Exam


Eye Exam: Normal appearance


Pupil Exam: NORMAL ACCOMODATION





- ENT Exam


ENT Exam: Mucous Membranes Moist





- Neck Exam


Neck Exam: Full ROM





- Respiratory Exam


Respiratory Exam: Clear to Ausculation Bilateral





- Cardiovascular Exam


Cardiovascular Exam: REGULAR RHYTHM





- GI/Abdominal Exam


GI & Abdominal Exam: Normal Bowel Sounds





-  Exam


 Exam: NORMAL INSPECTION





- Extremities Exam


Extremities Exam: Normal Inspection, Pedal Edema





- Back Exam


Back Exam: NORMAL INSPECTION





- Psychiatric Exam


Psychiatric exam: Normal Mood





- Skin


Skin Exam: Normal Color





Assessment and Plan





- Assessment and Plan (Free Text)


Assessment: 





cough imroved chest discomfort improved pain lower ext imroved hypoglyceamia 

imprved 


Plan: 





will discharge today f/u in my office one weeke

## 2019-03-13 NOTE — VASCLAB
Date of service: 



03/12/2019



PROCEDURE:  Lower Extremity Venous Duplex Exam.



HISTORY:

Pain legs r/o dvt 



PRIORS:

None. 



TECHNIQUE:

Bilateral common femoral, femoral, popliteal and posterior tibial, 

peroneal and great saphenous veins were evaluated. Flow was assessed 

with color Doppler, compressibility, assessment of phasic flow and 

augmentation response.



Report prepared by   SHANTELL Barahona



FINDINGS:



RIGHT:

1. Common Femoral Vein: 



1.1. Compressibility - Fully compressible: Thrombus -  None : Flow - 

Phasic: Augmentation -Normal: Reflux - None.



2. Femoral Vein: (Proximal-mid)



2.1. Compressibility - Fully compressible: Thrombus -  None : Flow - 

Phasic: Augmentation -Normal: Reflux - None.



3. Popliteal Vein: 



3.1. Compressibility - Fully compressible: Thrombus - None :  Flow - 

Phasic: Augmentation -Normal: Reflux - None.



4. Posterior Tibial Vein: (proximal)



4.1. Compressibility - Fully compressible: Thrombus -  None: Flow - 

Phasic: Augmentation -Normal: Reflux - None.



5. Peroneal Vein:



6. Great Saphenous Vein:



LEFT:

1. Common Femoral Vein:



1.1.  Compressibility - Fully compressible: Thrombus -  None: Flow - 

Phasic: Augmentation -Normal: Reflux - None.



2. Femoral Vein:



2.1.  Compressibility - Fully compressible: Thrombus -  None: Flow - 

Phasic: Augmentation -Normal: Reflux - None.



3. Popliteal Vein:



3.1.  Compressibility - Fully compressible: Thrombus -  None : Flow - 

Phasic: Augmentation -Normal: Reflux - None.



4. Posterior Tibial Vein: (proximal)



4.1.  Compressibility - Fully compressible: Thrombus -  None: Flow - 

Phasic: Augmentation -Normal: Reflux - None.



5. Peroneal Vein:



6. Great Saphenous Vein:



OTHER FINDINGS:  Limited evaluation due to patient intolerance to 

probe compressions.



IMPRESSION:

No evidence of deep or superficial vein thrombosis of the examined 

veins in bilateral lower extremities.

## 2019-03-13 NOTE — CP.PCM.PN
Subjective





- Date & Time of Evaluation


Date of Evaluation: 03/13/19


Time of Evaluation: 14:01





Objective





- Vital Signs/Intake and Output


Vital Signs (last 24 hours): 


                                        











Temp Pulse Resp BP Pulse Ox


 


 97.9 F   74   18   120/73   96 


 


 03/13/19 07:00  03/13/19 13:04  03/13/19 07:00  03/13/19 07:00  03/13/19 07:00








Intake and Output: 


                                        











 03/13/19 03/13/19





 06:59 18:59


 


Intake Total 820 


 


Balance 820 














- Medications


Medications: 


                               Current Medications





Dextrose (Dextrose 50% Inj)  0 ml IV STAT PRN; Protocol


   PRN Reason: Hypoglycemia Protocol


Dextrose (Glutose 15)  0 gm PO ONCE PRN; Protocol


   PRN Reason: Hypoglycemia Protocol


Enoxaparin Sodium (Lovenox)  30 mg SC DAILY [facility]


   Last Admin: 03/13/19 09:47 Dose:  30 mg


Fluticasone/Vilanterol (Breo Ellipta 100-25 Mcg Inh)  1 puff INH RQ24 [facility]


   Last Admin: 03/13/19 08:29 Dose:  Not Given


Glucagon (Glucagen Diagnostic Kit)  0 mg IM STAT PRN; Protocol


   PRN Reason: Hypoglycemia Protocol


Guaifenesin (Mucinex La)  600 mg PO BID [facility]


   Last Admin: 03/13/19 09:43 Dose:  600 mg


Sodium Chloride (Sodium Chloride 0.45%)  1,000 mls @ 60 mls/hr IV .[id number] [facility]


   Last Admin: 03/13/19 12:46 Dose:  Not Given


Cefepime HCl (Maxipime Iv 1 Gm Premix)  1 gm in 50 mls @ 100 mls/hr IVPB Q12H 

[facility]; Protocol


   Last Admin: 03/13/19 12:38 Dose:  100 mls/hr


Dextrose (Dextrose 5% In Water 1000 Ml)  1,000 mls @ 0 mls/hr IV .Q0M PRN; 

Protocol


   PRN Reason: Hypoglycemia Protocol


Sodium Chloride (Sodium Chloride 0.9%)  1,000 mls @ 100 mls/hr IV .Q10H [facility]


   Stop: 03/13/19 21:49


   Last Admin: 03/13/19 12:07 Dose:  100 mls/hr


Insulin Aspart (Novolog)  0 unit SC ACHS [facility]; Protocol


   Last Admin: 03/13/19 12:37 Dose:  Not Given


Lactulose (Enulose)  20 gm PO DAILY [facility]


   Last Admin: 03/13/19 09:43 Dose:  20 gm


Loratadine (Claritin)  10 mg PO DAILY [facility]


   Last Admin: 03/13/19 09:43 Dose:  10 mg


Losartan Potassium (Cozaar)  100 mg PO DAILY [facility]


   Last Admin: 03/13/19 09:43 Dose:  100 mg


Multivitamins (Hexavitamin)  1 tab PO DAILY [facility]


   Last Admin: 03/13/19 09:43 Dose:  1 tab


Rosuvastatin Calcium (Crestor)  5 mg PO HS [facility]


   Last Admin: 03/12/19 21:04 Dose:  5 mg


Senna/Docusate Sodium (Senokot S 50 Mg-8.6 Mg)  1 tab PO DAILY [facility]


   Last Admin: 03/13/19 10:28 Dose:  1 tab


Tolterodine Tartrate (Detrol La)  2 mg PO DAILY [facility]


   Last Admin: 03/13/19 10:28 Dose:  2 mg











- Labs


Labs: 


                                        





                                 03/13/19 11:37 





                                 03/13/19 12:30 





                                        











PT  12.5 SECONDS (9.7-12.2)  H  03/10/19  14:04    


 


INR  1.1   03/10/19  14:04    


 


APTT  31 SECONDS (21-34)   03/10/19  14:04    














Assessment and Plan





- Assessment and Plan (Free Text)


Assessment: 





FOLLOW UP WITH DR METZ IN HER OFFICE -----CALL FOR APPOINTMENT


FOLLOW UP WITH DR HUNTER IN HIS OFFICE -----CALL FOR APPOINTMENT


CONTINUE HOME MEDICATION 


NEW PRESCRIPTION GIVEN 


   ASPIRIN 81 MG PO DAILY 


ACTIVITY AS TOLERATED


CALL DR METZ OR GO TO THE EMERGENCY ROOM IF SYMPTOM RETURN OR WORSENING